# Patient Record
Sex: MALE | Race: WHITE | NOT HISPANIC OR LATINO | URBAN - METROPOLITAN AREA
[De-identification: names, ages, dates, MRNs, and addresses within clinical notes are randomized per-mention and may not be internally consistent; named-entity substitution may affect disease eponyms.]

---

## 2024-01-21 ENCOUNTER — HOSPITAL ENCOUNTER (OUTPATIENT)
Dept: RADIOLOGY | Facility: HOSPITAL | Age: 49
Discharge: HOME | End: 2024-01-21
Payer: COMMERCIAL

## 2024-01-21 DIAGNOSIS — Z13.6 ENCOUNTER FOR SCREENING FOR CARDIOVASCULAR DISORDERS: ICD-10-CM

## 2024-01-21 PROCEDURE — 75571 CT HRT W/O DYE W/CA TEST: CPT

## 2025-05-18 ENCOUNTER — OFFICE VISIT (OUTPATIENT)
Dept: URGENT CARE | Age: 50
End: 2025-05-18
Payer: COMMERCIAL

## 2025-05-18 VITALS
HEART RATE: 66 BPM | DIASTOLIC BLOOD PRESSURE: 84 MMHG | OXYGEN SATURATION: 96 % | TEMPERATURE: 99.1 F | RESPIRATION RATE: 18 BRPM | SYSTOLIC BLOOD PRESSURE: 135 MMHG

## 2025-05-18 DIAGNOSIS — J01.90 ACUTE SINUSITIS, RECURRENCE NOT SPECIFIED, UNSPECIFIED LOCATION: Primary | ICD-10-CM

## 2025-05-18 RX ORDER — AMOXICILLIN AND CLAVULANATE POTASSIUM 875; 125 MG/1; MG/1
1 TABLET, FILM COATED ORAL 2 TIMES DAILY
Qty: 20 TABLET | Refills: 0 | Status: SHIPPED | OUTPATIENT
Start: 2025-05-18 | End: 2025-05-25

## 2025-05-18 NOTE — PROGRESS NOTES
Subjective   Patient ID: Andres Jackson is a 50 y.o. male. They present today with a chief complaint of Cough and Nasal Congestion (3 weeks).    CC: URI symptoms x 2 to 3 weeks.  Waxing and waning.    HPI: Patient presenting for runny nose, coughing, congestion.  Symptoms have been progressive and worsening.  No trismus or drooling.  No difficulty swallowing.  No chest pain, shortness of breath, abdominal pain, nausea, vomiting, diarrhea, rash.    Past Medical History  Allergies as of 05/18/2025    (No Known Allergies)       Prescriptions Prior to Admission[1]     Medical History[2]    Surgical History[3]         Review of Systems  Review of Systems    After reviewing all body systems I have documented pertinent findings above in the history.  All other Systems reviewed and are negative for complaint.  Pertinent positive and negatives are listed in the above HPI.    Objective    Vitals:    05/18/25 1557   BP: 135/84   Pulse: 66   Resp: 18   Temp: 37.3 °C (99.1 °F)   SpO2: 96%     No LMP for male patient.    Physical Exam    General: Alert, oriented, and cooperative.  No acute distress. Well developed, well nourished.     Skin: Skin is warm, and dry. No rashes or lesions.    Eyes: Sclera and conjunctivae normal     Ears: TM´s are intact, pink, with slight effusions bilaterally.  No hemotympanum or rupture. Bilateral auricle, helix, and tragus without inflammation or erythema.  No mastoid erythema, or tenderness.  No deformities. Right and left canal negative for erythema, or discharge.  Hearing is grossly intact bilaterally    Mouth/Throat: Pharynx is erythematous.  Tonsils are equal in size.  No exudates.  No angioedema of the lips or tongue.  No respiratory compromise, tripoding, drooling, muffled voice,  stridor, or trismus.     Neck: Supple.     Cardiac: Regular rate and rhythm    Respiratory:  No acute respiratory distress.  Regular rate of breathing.  No accessory muscle use.  No tripoding.  Lungs are clear  bilaterally.      Procedures    Point of Care Test & Imaging Results from this visit  Results for orders placed or performed in visit on 06/10/21   Sars-CoV-2 PCR, Screen Asymptomatic    Collection Time: 06/10/21 10:40 AM   Result Value Ref Range    SARS-CoV-2 Result NOT DETECTED Not Detected     Imaging  No results found.    Cardiology, Vascular, and Other Imaging  No other imaging results found for the past 2 days      Diagnostic study results (if any) were reviewed by Orlando Burnett PA-C.    Assessment/Plan   Allergies, medications, history, and pertinent labs/EKGs/Imaging reviewed by Orlando Burnett PA-C.       MDM:  Patient presenting for sinus congestion, and pressure.    Symptoms have been prolonged, and worsening.  Patient does not appear toxic.   No acute distress or respiratory compromise.    No tripoding. No nasal flaring.    No oral lesions, drooling, stridor, or angioedema.   Neck is supple without meningeal signs. Lungs clear.    No clinical signs or history to suggest meningitis, [place]´s, peritonsillar abscess, epiglottitis, pneumonia, or asthma.     -Symptoms lasting longer than expected, worsening sinus pressure  -Prescribed antibiotic therapy.  -Advised supportive therapy with over-the-counter cold medications such as antihistamines and decongestants.     -Advised to follow-up with PCP in the next 5 to 7 days especially if no improvement or resolution of symptoms.  -Pt/family instructed to return to the urgent care or emergency department if symptoms worsen or if new symptoms develop.          Orders and Diagnoses  Diagnoses and all orders for this visit:  Acute sinusitis, recurrence not specified, unspecified location  -     amoxicillin-clavulanate (Augmentin) 875-125 mg tablet; Take 1 tablet by mouth 2 times a day for 7 days.      Medical Admin Record      Patient disposition: Home    Electronically signed by Orlando Burnett PA-C  4:03 PM         [1] (Not in a hospital admission)   [2] No  past medical history on file.  [3] No past surgical history on file.